# Patient Record
Sex: MALE
[De-identification: names, ages, dates, MRNs, and addresses within clinical notes are randomized per-mention and may not be internally consistent; named-entity substitution may affect disease eponyms.]

---

## 2023-09-04 ENCOUNTER — NURSE TRIAGE (OUTPATIENT)
Dept: OTHER | Facility: CLINIC | Age: 63
End: 2023-09-04

## 2023-09-04 NOTE — TELEPHONE ENCOUNTER
Location of patient: [place]    Subjective: Caller states \"Bright red blood in the toilet bowl. Entire bowel was red. \"     Current Symptoms: toilet bowel red from feeling he needed to have a bowel movement. Diarrhea, mild dizziness, clammy. Onset:  Now    Denies fever, abdominal cramping, injury    Has never had a colonoscopy. Has never had rectal bleeding in the past.    Associated Symptoms: diarrhea    Pain Severity: /NA     Temperature: Denies     What has been tried: NA    Recommended disposition: Go to ED Now    Insurance questions  In network vs out of network. Usually covered at any emergency room, but if out of network, costs will be higher. Check your plan online. Care advice provided, patient verbalizes understanding; denies any other questions or concerns; instructed to call back for any new or worsening symptoms. Patient/caller agrees to proceed to the Emergency Department    This triage is a result of a call to [address] [place] [address]. Please do not respond to the triage nurse through this encounter. Any subsequent communication should be directly with the patient.     Reason for Disposition   Patient sounds very sick or weak to the triager    Protocols used: Rectal Bleeding-ADULT-[facility]

## 2023-09-06 ENCOUNTER — NURSE TRIAGE (OUTPATIENT)
Dept: OTHER | Facility: CLINIC | Age: 63
End: 2023-09-06

## 2023-09-06 NOTE — TELEPHONE ENCOUNTER
Location of patient: [place]    Subjective: Caller states \"\"     Current Symptoms: seen in ER Monday-dx diverticulitis. Still having blood in stool. Monday blood was bright red now Dark maroon blood per pt. 2 episodes today, with blood clots. Urgency with BM, watery with little to no stool, changing toilet water red. Denies pain, nausea, weakness, dizziness or fatigue    Onset: Monday    Associated Symptoms: reduced appetite    Pain Severity: 0/10; ;     Temperature: denies     What has been tried: drinking fluids    LMP: NA Pregnant: NA    Recommended disposition: Go to ED Now    Care advice provided, patient verbalizes understanding; denies any other questions or concerns; instructed to call back for any new or worsening symptoms. Patient/caller agrees to proceed to nearest Emergency Department    This triage is a result of a call to [address] [place] [address]. Please do not respond to the triage nurse through this encounter. Any subsequent communication should be directly with the patient.       Reason for Disposition   SEVERE rectal bleeding (large blood clots; constant or on and off bleeding)    Protocols used: Rectal Bleeding-ADULT-[facility]